# Patient Record
Sex: FEMALE | URBAN - METROPOLITAN AREA
[De-identification: names, ages, dates, MRNs, and addresses within clinical notes are randomized per-mention and may not be internally consistent; named-entity substitution may affect disease eponyms.]

---

## 2021-06-24 ENCOUNTER — APPOINTMENT (RX ONLY)
Dept: URBAN - METROPOLITAN AREA CLINIC 151 | Facility: CLINIC | Age: 56
Setting detail: DERMATOLOGY
End: 2021-06-24

## 2021-06-24 DIAGNOSIS — D89.811 CHRONIC GRAFT-VERSUS-HOST DISEASE: ICD-10-CM | Status: INADEQUATELY CONTROLLED

## 2021-06-24 PROCEDURE — ? PRESCRIPTION

## 2021-06-24 PROCEDURE — ? DIAGNOSIS COMMENT

## 2021-06-24 PROCEDURE — 99204 OFFICE O/P NEW MOD 45 MIN: CPT

## 2021-06-24 PROCEDURE — ? RECOMMENDATIONS

## 2021-06-24 PROCEDURE — ? COUNSELING

## 2021-06-24 RX ORDER — CALCIPOTRIENE 0.05 MG/G
OINTMENT TOPICAL
Qty: 1 | Refills: 3 | Status: CANCELLED | COMMUNITY
Start: 2021-06-24

## 2021-06-24 RX ADMIN — CALCIPOTRIENE: 0.05 OINTMENT TOPICAL at 00:00

## 2021-06-24 NOTE — PROCEDURE: RECOMMENDATIONS
Render Risk Assessment In Note?: no
Detail Level: Zone
Recommendations (Free Text): Referred to Raul Mckeon MD and Kenan Mi MD for bone marrow transplant care as patient is transitioning care team locally.
Recommendation Preamble: The following recommendations were made during the visit:

## 2021-06-24 NOTE — PROCEDURE: DIAGNOSIS COMMENT
Comment: Patient is currently being treated with phototherapy at [place] 3 days/week, using triamcinolone BID on days not doing phototherapy, using Vanicream as moisturizer. Patient is currently on Otezla therapy.
Render Risk Assessment In Note?: no
Detail Level: Simple

## 2021-08-26 ENCOUNTER — APPOINTMENT (RX ONLY)
Dept: URBAN - METROPOLITAN AREA CLINIC 151 | Facility: CLINIC | Age: 56
Setting detail: DERMATOLOGY
End: 2021-08-26

## 2021-08-26 DIAGNOSIS — D89.811 CHRONIC GRAFT-VERSUS-HOST DISEASE: ICD-10-CM

## 2021-08-26 PROCEDURE — ? PRESCRIPTION

## 2021-08-26 PROCEDURE — ? PRESCRIPTION MEDICATION MANAGEMENT

## 2021-08-26 PROCEDURE — ? COUNSELING

## 2021-08-26 PROCEDURE — ? DIAGNOSIS COMMENT

## 2021-08-26 PROCEDURE — 99214 OFFICE O/P EST MOD 30 MIN: CPT

## 2021-08-26 RX ORDER — CALCIPOTRIENE 0.05 MG/G
OINTMENT TOPICAL
Qty: 240 | Refills: 3 | Status: ERX | COMMUNITY
Start: 2021-08-26

## 2021-08-26 RX ADMIN — CALCIPOTRIENE: 0.05 OINTMENT TOPICAL at 00:00

## 2021-08-26 NOTE — PROCEDURE: DIAGNOSIS COMMENT
Comment: Calcipotriene ointment improved the experimental site (R leg), so will apply to both legs now. Pt did food allergy testing (blood test) and found that she was eating a lot of foods that she is allergic to, once she removed those foods from her diet, she thinks it may have gotten better. 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 Pt reports that she recently got an at-home photo therapy box.
Render Risk Assessment In Note?: no
Detail Level: Simple
Comment: Continue UVB home therapy 2-3x/wk per Dr Ardon at [facility]

## 2021-08-26 NOTE — PROCEDURE: MIPS QUALITY
Quality 110: Preventive Care And Screening: Influenza Immunization: Influenza Immunization Administered during Influenza season
Quality 226: Preventive Care And Screening: Tobacco Use: Screening And Cessation Intervention: Patient screened for tobacco use and is an ex/non-smoker
Quality 402: Tobacco Use And Help With Quitting Among Adolescents: Patient screened for tobacco and never smoked
Quality 130: Documentation Of Current Medications In The Medical Record: Current Medications Documented
Detail Level: Detailed
Quality 431: Preventive Care And Screening: Unhealthy Alcohol Use - Screening: Patient screened for unhealthy alcohol use using a single question and scores less than 2 times per year

## 2021-08-26 NOTE — PROCEDURE: PRESCRIPTION MEDICATION MANAGEMENT
Render In Strict Bullet Format?: No
Detail Level: Zone
Continue Regimen: Vitamin D x2 week, if itching persists then go back to steroid.

## 2021-11-15 ENCOUNTER — APPOINTMENT (RX ONLY)
Dept: URBAN - METROPOLITAN AREA CLINIC 151 | Facility: CLINIC | Age: 56
Setting detail: DERMATOLOGY
End: 2021-11-15

## 2021-11-15 DIAGNOSIS — D89.811 CHRONIC GRAFT-VERSUS-HOST DISEASE: ICD-10-CM

## 2021-11-15 PROCEDURE — ? PRESCRIPTION MEDICATION MANAGEMENT

## 2021-11-15 PROCEDURE — ? COUNSELING

## 2021-11-15 PROCEDURE — ? DIAGNOSIS COMMENT

## 2021-11-15 PROCEDURE — 99214 OFFICE O/P EST MOD 30 MIN: CPT

## 2021-11-15 PROCEDURE — ? PRESCRIPTION

## 2021-11-15 ASSESSMENT — BSA RASH: BSA RASH: 75

## 2021-11-15 NOTE — PROCEDURE: DIAGNOSIS COMMENT
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 Patient is doing well with calcipotriene ointment, and reports having decrease prednisone therapy from 10mg to 7.5mg (has upcoming appt in which discussion of decreasing to 5mg will take place). Recommended AmLactin 12% cream as moister c nop)v=.  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 Patient is doing at-home UVB 2-3x/week per Dr. Ardon. Patient still has not found transplant care locally, thus recommended patient try seeking care at [facility] with Fatmata Leonard MD
Render Risk Assessment In Note?: no
Detail Level: Simple

## 2021-11-15 NOTE — PROCEDURE: PRESCRIPTION MEDICATION MANAGEMENT
Render In Strict Bullet Format?: No
Detail Level: Zone
Continue Regimen: Calcipotriene 0.005% ointment BID\\nUVB home therapy 2-3x/wk per Dr. Ardon at [facility]

## 2021-11-16 ENCOUNTER — RX ONLY (OUTPATIENT)
Age: 56
Setting detail: RX ONLY
End: 2021-11-16

## 2021-11-16 RX ORDER — CALCIPOTRIENE 0.05 MG/G
OINTMENT TOPICAL
Qty: 360 | Refills: 3 | Status: CANCELLED

## 2021-11-16 RX ORDER — CALCIPOTRIENE 0.05 MG/G
OINTMENT TOPICAL
Qty: 360 | Refills: 3 | Status: ERX

## 2022-01-27 ENCOUNTER — APPOINTMENT (RX ONLY)
Dept: URBAN - METROPOLITAN AREA CLINIC 151 | Facility: CLINIC | Age: 57
Setting detail: DERMATOLOGY
End: 2022-01-27

## 2022-01-27 DIAGNOSIS — D89.811 CHRONIC GRAFT-VERSUS-HOST DISEASE: ICD-10-CM

## 2022-01-27 PROCEDURE — ? PRESCRIPTION MEDICATION MANAGEMENT

## 2022-01-27 PROCEDURE — ? COUNSELING

## 2022-01-27 PROCEDURE — ? PRESCRIPTION

## 2022-01-27 PROCEDURE — ? DIAGNOSIS COMMENT

## 2022-01-27 PROCEDURE — 99214 OFFICE O/P EST MOD 30 MIN: CPT

## 2022-01-27 RX ORDER — CLOBETASOL PROPIONATE 0.05 MG/G
GEL TOPICAL
Qty: 30 | Refills: 2 | Status: ERX | COMMUNITY
Start: 2022-01-27

## 2022-01-27 RX ORDER — FLURANDRENOLIDE 4 UG/CM2
TAPE TOPICAL
Qty: 1 | Refills: 3 | Status: ERX | COMMUNITY
Start: 2022-01-27

## 2022-01-27 RX ADMIN — CLOBETASOL PROPIONATE: 0.05 GEL TOPICAL at 00:00

## 2022-01-27 RX ADMIN — FLURANDRENOLIDE: 4 TAPE TOPICAL at 00:00

## 2022-01-27 NOTE — PROCEDURE: PRESCRIPTION MEDICATION MANAGEMENT
Render In Strict Bullet Format?: No
Detail Level: Zone
Continue Regimen: Calcipotriene 0.005% ointment BID\\nUVB home therapy 2-3x/wk per Dr. Ardon at [facility]

## 2022-01-27 NOTE — PROCEDURE: DIAGNOSIS COMMENT
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 Patient is doing well with calcipotriene ointment and Patient is doing at-home UVB 2-3x/week per Dr. Ardon. Patient still has not found transplant care locally, thus recommended patient try seeking care at [facility].
Render Risk Assessment In Note?: no
Detail Level: Simple
Comment: Focal hyperesthestic plaque on the L. Parietal scalp—discussed alternative options and will do a trial of Cordran tape as a barrier

## 2022-02-09 ENCOUNTER — RX ONLY (OUTPATIENT)
Age: 57
Setting detail: RX ONLY
End: 2022-02-09

## 2022-02-09 RX ORDER — CALCIPOTRIENE 0.05 MG/G
OINTMENT TOPICAL
Qty: 360 | Refills: 3 | Status: ERX

## 2022-05-23 RX ORDER — FLURANDRENOLIDE 4 UG/CM2
TAPE TOPICAL
Qty: 1 | Refills: 3 | Status: ERX

## 2022-07-28 ENCOUNTER — RX ONLY (OUTPATIENT)
Age: 57
Setting detail: RX ONLY
End: 2022-07-28

## 2022-07-28 ENCOUNTER — APPOINTMENT (RX ONLY)
Dept: URBAN - METROPOLITAN AREA CLINIC 151 | Facility: CLINIC | Age: 57
Setting detail: DERMATOLOGY
End: 2022-07-28

## 2022-07-28 DIAGNOSIS — D89.811 CHRONIC GRAFT-VERSUS-HOST DISEASE: ICD-10-CM

## 2022-07-28 PROCEDURE — 99214 OFFICE O/P EST MOD 30 MIN: CPT

## 2022-07-28 PROCEDURE — ? PRESCRIPTION

## 2022-07-28 PROCEDURE — ? COUNSELING

## 2022-07-28 PROCEDURE — ? PRESCRIPTION MEDICATION MANAGEMENT

## 2022-07-28 PROCEDURE — ? DIAGNOSIS COMMENT

## 2022-07-28 RX ORDER — CALCIPOTRIENE 50 UG/G
OINTMENT TOPICAL
Qty: 360 | Refills: 1 | Status: ERX

## 2022-07-28 RX ORDER — FLURANDRENOLIDE 4 UG/CM2
TAPE TOPICAL
Qty: 3 | Refills: 1 | Status: ERX

## 2022-07-28 NOTE — PROCEDURE: DIAGNOSIS COMMENT
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 Patient is doing well with calcipotriene ointment , TAC , AmLactin and cordon tape to scalp. Patient reports decreasing UVB to 2x week per Dr. Ardon. Advised to discontinue Triamcinolone, Will continue calcipotriene, AmLactin and cordon tape. Explained can discontinue cordon tape if pt doesn’t feeling itchy sensation. Explained long term use of cordon tape can cause lightening of skin. Recommended Duoderm as protection. FU 4 months
Render Risk Assessment In Note?: no
Detail Level: Simple
Comment: Focal hyperesthestic plaque on the L. Parietal scalp—discussed alternative options and will do a trial of Cordran tape as a barrier

## 2022-11-17 ENCOUNTER — RX ONLY (OUTPATIENT)
Age: 57
Setting detail: RX ONLY
End: 2022-11-17

## 2022-11-17 RX ORDER — FLURANDRENOLIDE 4 UG/CM2
TAPE TOPICAL
Qty: 3 | Refills: 2 | Status: ERX

## 2022-12-02 ENCOUNTER — APPOINTMENT (RX ONLY)
Dept: URBAN - METROPOLITAN AREA CLINIC 151 | Facility: CLINIC | Age: 57
Setting detail: DERMATOLOGY
End: 2022-12-02

## 2022-12-02 DIAGNOSIS — D89.811 CHRONIC GRAFT-VERSUS-HOST DISEASE: ICD-10-CM

## 2022-12-02 PROCEDURE — ? PRESCRIPTION MEDICATION MANAGEMENT

## 2022-12-02 PROCEDURE — ? COUNSELING

## 2022-12-02 PROCEDURE — 99214 OFFICE O/P EST MOD 30 MIN: CPT

## 2022-12-02 PROCEDURE — ? DIAGNOSIS COMMENT

## 2022-12-02 NOTE — PROCEDURE: PRESCRIPTION MEDICATION MANAGEMENT
Discontinue Regimen: Cordran Tape Large Roll 4 mcg/cm2 \\n\\nTAC 0.1% ointment QD PRN
Render In Strict Bullet Format?: No
Initiate Treatment: Sarna Lotion
Detail Level: Zone
Continue Regimen: Calcipotriene 0.005% ointment BID\\nUVB home therapy 2-3x/wk per Dr. Ardon at [facility]

## 2022-12-02 NOTE — PROCEDURE: DIAGNOSIS COMMENT
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 Patient is doing well with calcipotriene ointment , TAC , AmLactin and cordon tape to scalp. Patient reports decreasing UVB to 2x week per Dr. Ardon.\\n\\nPt admits to rubbing/touching her scalp quite often. Advised her to discontinue the Cordran tape and minimize her TAC 0.1% oint usage to only PRN to avoid atrophy. Recommend OTC Sarna lotion to provide relief to any potential irritation. Advised her to continue UVB home therapy 2-3x/wk per Dr. Ardon at [facility].
Render Risk Assessment In Note?: no
Detail Level: Simple

## 2022-12-21 ENCOUNTER — RX ONLY (OUTPATIENT)
Age: 57
Setting detail: RX ONLY
End: 2022-12-21

## 2022-12-21 RX ORDER — CALCIPOTRIENE 50 UG/G
OINTMENT TOPICAL
Qty: 360 | Refills: 1 | Status: ERX

## 2023-02-28 ENCOUNTER — RX ONLY (OUTPATIENT)
Age: 58
Setting detail: RX ONLY
End: 2023-02-28

## 2023-02-28 RX ORDER — CALCIPOTRIENE 50 UG/G
OINTMENT TOPICAL
Qty: 360 | Refills: 2 | Status: ERX

## 2023-07-20 ENCOUNTER — RX ONLY (OUTPATIENT)
Age: 58
Setting detail: RX ONLY
End: 2023-07-20

## 2023-07-20 ENCOUNTER — APPOINTMENT (RX ONLY)
Dept: URBAN - METROPOLITAN AREA CLINIC 151 | Facility: CLINIC | Age: 58
Setting detail: DERMATOLOGY
End: 2023-07-20

## 2023-07-20 DIAGNOSIS — D89.811 CHRONIC GRAFT-VERSUS-HOST DISEASE: ICD-10-CM

## 2023-07-20 PROCEDURE — 99214 OFFICE O/P EST MOD 30 MIN: CPT

## 2023-07-20 PROCEDURE — ? COUNSELING

## 2023-07-20 PROCEDURE — ? PRESCRIPTION MEDICATION MANAGEMENT

## 2023-07-20 PROCEDURE — ? DIAGNOSIS COMMENT

## 2023-07-20 RX ORDER — CLOBETASOL PROPIONATE 0.05 MG/G
GEL TOPICAL
Qty: 30 | Refills: 0 | Status: ERX

## 2023-07-20 RX ORDER — FLURANDRENOLIDE 4 UG/CM2
TAPE TOPICAL
Qty: 3 | Refills: 2 | Status: ERX

## 2023-07-20 NOTE — PROCEDURE: DIAGNOSIS COMMENT
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 Patient is doing well with calcipotriene ointment , TAC , AmLactin and cordon tape to scalp. Patient reports decreasing UVB to 2x week per Dr. Ardon.\\n\\nNo active GVHD. Rec moisturizing daily, especially on lower legs. Advised pigmentation changes can recover to normal over long periods of time. Discussed using less UVB, but advised being careful to avoid relapsing. Advised stopping topical clobetasol and seeing if symptoms remain clear.
Render Risk Assessment In Note?: no
Detail Level: Simple

## 2023-07-20 NOTE — PROCEDURE: PRESCRIPTION MEDICATION MANAGEMENT
Discontinue Regimen: Cordran Tape Large Roll 4 mcg/cm2 \\n\\nTAC 0.1% ointment QD PRN
Render In Strict Bullet Format?: No
Initiate Treatment: Sarna Lotion
Detail Level: Zone
Continue Regimen: Calcipotriene 0.005% ointment BID\\nUVB home therapy 2-3x/wk per Dr. Ardon at [facility]

## 2023-07-20 NOTE — HPI: OTHER
Condition:: Chronic graft vs host disease f/u
Please Describe Your Condition:: Pt is using UVB 2x per week, AmLactin, calcipotriene, she has stopped otezla as of the end of June. Stopping at[place]ne end of July, down to 2mg QD prednisone. States her skin is doing much better. No active spots. Looking to discuss hyper/hypopigmentation treatment options.

## 2024-01-11 ENCOUNTER — APPOINTMENT (RX ONLY)
Dept: URBAN - METROPOLITAN AREA CLINIC 151 | Facility: CLINIC | Age: 59
Setting detail: DERMATOLOGY
End: 2024-01-11

## 2024-01-11 DIAGNOSIS — D89.811 CHRONIC GRAFT-VERSUS-HOST DISEASE: ICD-10-CM

## 2024-01-11 DIAGNOSIS — L21.8 OTHER SEBORRHEIC DERMATITIS: ICD-10-CM

## 2024-01-11 PROCEDURE — ? REFERRAL CORRESPONDENCE

## 2024-01-11 PROCEDURE — ? PRESCRIPTION MEDICATION MANAGEMENT

## 2024-01-11 PROCEDURE — ? PRESCRIPTION

## 2024-01-11 PROCEDURE — ? PHOTO-DOCUMENTATION

## 2024-01-11 PROCEDURE — ? DIAGNOSIS COMMENT

## 2024-01-11 PROCEDURE — ? COUNSELING

## 2024-01-11 PROCEDURE — 99214 OFFICE O/P EST MOD 30 MIN: CPT

## 2024-01-11 RX ORDER — KETOCONAZOLE 20 MG/ML
SHAMPOO, SUSPENSION TOPICAL
Qty: 120 | Refills: 3 | Status: ERX | COMMUNITY
Start: 2024-01-11

## 2024-01-11 RX ADMIN — KETOCONAZOLE: 20 SHAMPOO, SUSPENSION TOPICAL at 00:00

## 2024-01-11 NOTE — PROCEDURE: PRESCRIPTION MEDICATION MANAGEMENT
Discontinue Regimen: Cordran Tape Large Roll 4 mcg/cm2 \\n\\nTAC 0.1% ointment QD PRN
Render In Strict Bullet Format?: No
Initiate Treatment: Sarna Lotion
Detail Level: Zone
Continue Regimen: Calcipotriene 0.005% ointment BID\\nUVB home therapy 2-3x/wk per Dr. Ardon at [facility]

## 2024-01-11 NOTE — PROCEDURE: DIAGNOSIS COMMENT
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 Patient is doing well with calcipotriene ointment , TAC , AmLactin and cordon tape to scalp. Patient reports decreasing UVB to 2x week per Dr. Spivey.\\n\\nReports she is expected to d/c UVB in 3 weeks, Dr. spivey instructed her to d/c UVB 1 month after discontinuing prednisone which she stopped on 01/2024. Continue topicals as listed below PRN.
Render Risk Assessment In Note?: no
Detail Level: Simple

## 2024-07-11 ENCOUNTER — APPOINTMENT (RX ONLY)
Dept: URBAN - METROPOLITAN AREA CLINIC 151 | Facility: CLINIC | Age: 59
Setting detail: DERMATOLOGY
End: 2024-07-11

## 2024-07-11 DIAGNOSIS — D89.811 CHRONIC GRAFT-VERSUS-HOST DISEASE: ICD-10-CM

## 2024-07-11 PROCEDURE — ? COUNSELING

## 2024-07-11 PROCEDURE — ? PHOTO-DOCUMENTATION

## 2024-07-11 PROCEDURE — ? DIAGNOSIS COMMENT

## 2024-07-11 PROCEDURE — ? PRESCRIPTION MEDICATION MANAGEMENT

## 2024-07-11 PROCEDURE — 99214 OFFICE O/P EST MOD 30 MIN: CPT

## 2024-07-11 PROCEDURE — ? REFERRAL CORRESPONDENCE

## 2024-07-11 NOTE — PROCEDURE: PRESCRIPTION MEDICATION MANAGEMENT
Discontinue Regimen: Cordran Tape Large Roll 4 mcg/cm2 \\n\\nTAC 0.1% ointment QD PRN
Render In Strict Bullet Format?: No
Initiate Treatment: Sarna Lotion
Detail Level: Zone
Continue Regimen: Calcipotriene 0.005% ointment BID\\nUVB home therapy 2-3x/wk per Dr. Ardon at [facility]

## 2024-07-11 NOTE — PROCEDURE: DIAGNOSIS COMMENT
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 Patient is doing well with calcipotriene ointment , TAC .
Render Risk Assessment In Note?: no
Detail Level: Simple

## 2024-07-23 ENCOUNTER — RX ONLY (OUTPATIENT)
Age: 59
Setting detail: RX ONLY
End: 2024-07-23

## 2024-07-23 RX ORDER — KETOCONAZOLE 20 MG/ML
SHAMPOO, SUSPENSION TOPICAL
Qty: 120 | Refills: 3 | Status: ERX

## 2024-07-23 RX ORDER — KETOCONAZOLE 20 MG/G
CREAM TOPICAL
Qty: 30 | Refills: 2 | Status: ERX | COMMUNITY
Start: 2024-07-23

## 2024-11-19 ENCOUNTER — RX ONLY (OUTPATIENT)
Age: 59
Setting detail: RX ONLY
End: 2024-11-19

## 2024-11-19 RX ORDER — KETOCONAZOLE 20 MG/ML
SHAMPOO, SUSPENSION TOPICAL
Qty: 120 | Refills: 3 | Status: ERX

## 2025-01-16 ENCOUNTER — RX ONLY (RX ONLY)
Age: 60
End: 2025-01-16

## 2025-01-16 RX ORDER — KETOCONAZOLE 20 MG/ML
SHAMPOO, SUSPENSION TOPICAL
Qty: 120 | Refills: 3 | Status: ERX